# Patient Record
Sex: FEMALE | Race: WHITE | NOT HISPANIC OR LATINO | URBAN - METROPOLITAN AREA
[De-identification: names, ages, dates, MRNs, and addresses within clinical notes are randomized per-mention and may not be internally consistent; named-entity substitution may affect disease eponyms.]

---

## 2017-11-17 ENCOUNTER — IMPORTED ENCOUNTER (OUTPATIENT)
Dept: URBAN - METROPOLITAN AREA CLINIC 50 | Facility: CLINIC | Age: 72
End: 2017-11-17

## 2017-11-27 ENCOUNTER — IMPORTED ENCOUNTER (OUTPATIENT)
Dept: URBAN - METROPOLITAN AREA CLINIC 50 | Facility: CLINIC | Age: 72
End: 2017-11-27

## 2018-12-31 ENCOUNTER — IMPORTED ENCOUNTER (OUTPATIENT)
Dept: URBAN - METROPOLITAN AREA CLINIC 50 | Facility: CLINIC | Age: 73
End: 2018-12-31

## 2019-09-23 ENCOUNTER — IMPORTED ENCOUNTER (OUTPATIENT)
Dept: URBAN - METROPOLITAN AREA CLINIC 50 | Facility: CLINIC | Age: 74
End: 2019-09-23

## 2020-08-31 ENCOUNTER — IMPORTED ENCOUNTER (OUTPATIENT)
Dept: URBAN - METROPOLITAN AREA CLINIC 50 | Facility: CLINIC | Age: 75
End: 2020-08-31

## 2021-04-18 ASSESSMENT — TONOMETRY
OD_IOP_MMHG: 14
OD_IOP_MMHG: 14
OD_IOP_MMHG: 12
OS_IOP_MMHG: 14
OS_IOP_MMHG: 15
OD_IOP_MMHG: 16
OD_IOP_MMHG: 16
OD_IOP_MMHG: 12
OD_IOP_MMHG: 14
OD_IOP_MMHG: 14
OS_IOP_MMHG: 13
OS_IOP_MMHG: 15

## 2021-04-18 ASSESSMENT — VISUAL ACUITY
OD_BAT: 20/400-
OS_CC: J1+@ 16 IN
OS_CC: J1+
OS_CC: J3
OS_CC: J1+
OD_CC: 20/25
OD_SC: 20/20
OS_BAT: 20/400-
OD_CC: J3
OD_CC: J1+
OS_OTHER: 20/400-. 20/400-.
OS_CC: 20/30
OD_CC: J1+
OS_SC: 20/30
OD_SC: 20/30
OS_SC: 20/30+2
OD_OTHER: 20/400-. 20/400-.
OS_CC: 20/25
OD_CC: 20/20
OD_CC: J1+@ 16 IN

## 2021-04-18 ASSESSMENT — PACHYMETRY
OD_CT_UM: 584
OS_CT_UM: 552
OD_CT_UM: 584
OD_CT_UM: 584
OS_CT_UM: 552
OD_CT_UM: 584
OD_CT_UM: 584
OS_CT_UM: 552

## 2021-08-26 ENCOUNTER — PREPPED CHART (OUTPATIENT)
Dept: URBAN - METROPOLITAN AREA CLINIC 52 | Facility: CLINIC | Age: 76
End: 2021-08-26

## 2021-08-30 ENCOUNTER — ANNUAL COMPREHENSIVE EXAM (OUTPATIENT)
Dept: URBAN - METROPOLITAN AREA CLINIC 52 | Facility: CLINIC | Age: 76
End: 2021-08-30

## 2021-08-30 DIAGNOSIS — E11.9: ICD-10-CM

## 2021-08-30 DIAGNOSIS — H40.013: ICD-10-CM

## 2021-08-30 DIAGNOSIS — H43.813: ICD-10-CM

## 2021-08-30 PROCEDURE — 92134 CPTRZ OPH DX IMG PST SGM RTA: CPT

## 2021-08-30 PROCEDURE — 92014 COMPRE OPH EXAM EST PT 1/>: CPT

## 2021-08-30 ASSESSMENT — VISUAL ACUITY
OS_GLARE: 20/400
OS_CC: 20/30-2
OS_GLARE: 20/70
OU_CC: J1
OD_GLARE: 20/50
OD_CC: 20/30-2
OD_GLARE: 20/60

## 2021-08-30 ASSESSMENT — TONOMETRY
OS_IOP_MMHG: 13
OS_IOP_MMHG: 13
OD_IOP_MMHG: 11
OD_IOP_MMHG: 13

## 2023-04-05 ENCOUNTER — ESTABLISHED PATIENT (OUTPATIENT)
Dept: URBAN - METROPOLITAN AREA CLINIC 52 | Facility: CLINIC | Age: 78
End: 2023-04-05

## 2023-04-05 DIAGNOSIS — H35.372: ICD-10-CM

## 2023-04-05 DIAGNOSIS — H43.813: ICD-10-CM

## 2023-04-05 DIAGNOSIS — H53.2: ICD-10-CM

## 2023-04-05 DIAGNOSIS — H40.013: ICD-10-CM

## 2023-04-05 DIAGNOSIS — E11.9: ICD-10-CM

## 2023-04-05 PROCEDURE — 92012 INTRM OPH EXAM EST PATIENT: CPT

## 2023-04-05 ASSESSMENT — TONOMETRY
OD_IOP_MMHG: 06
OS_IOP_MMHG: 09
OS_IOP_MMHG: 09
OD_IOP_MMHG: 08

## 2023-04-05 ASSESSMENT — VISUAL ACUITY
OS_CC: 20/25-2
OU_CC: 20/25
OD_CC: 20/25-2
OU_CC: J1

## 2024-10-16 NOTE — PATIENT DISCUSSION
COUNSELING: Transitional Care Follow Up Visit  Subjective     Kiley Last is a 78 y.o. female who presents for a transitional care management visit.    Within 48 business hours after discharge our office contacted her via telephone to coordinate her care and needs.      I reviewed and discussed the details of that call along with the discharge summary, hospital problems, inpatient lab results, inpatient diagnostic studies, and consultation reports with Kiley.     Current outpatient and discharge medications have been reconciled for the patient.  Reviewed by: Miranda Morgan MD          10/9/2024     4:53 PM   Date of TCM Phone Call   [facility]   Date of Admission 10/8/2024   Date of Discharge 10/9/2024   Discharge Disposition Home or Self Care     Risk for Readmission (LACE) Score: 1 (10/9/2024  6:00 AM)      History of Present Illness   Course During Hospital Stay:  admitted to [facility] with sudden onset of at least 2 episodes of chest discomfort, associated with nausea and diaphoresis. 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 Full eval in  was reassuring - all test reviewed. She was d/c home on pantoprazole. She hasn't had any symptoms since. She complains of ongoing fatigue and diffuse arthritis.  She has had several deaths in her family which have been very hard.     The following portions of the patient's history were reviewed and updated as appropriate: allergies, current medications, past family history, past medical history, past social history, past surgical history, and problem list.    Review of Systems   Constitutional:  Positive for fatigue. Negative for activity change and unexpected weight change.   Respiratory:  Negative for cough, chest tightness and shortness of breath.    Musculoskeletal:  Positive for arthralgias.   Neurological:  Negative for light-headedness and headaches.   Psychiatric/Behavioral:  Negative for dysphoric mood.  "Nervous/anxious: feels the citalopram is helpful.        Objective   /64   Pulse 78   Ht 165.1 cm (65\")   Wt 77.1 kg (170 lb)   BMI 28.29 kg/m²   Physical Exam  Constitutional:       Appearance: Normal appearance.   Cardiovascular:      Rate and Rhythm: Normal rate and regular rhythm.   Pulmonary:      Effort: Pulmonary effort is normal.   Chest:      Chest wall: No tenderness.   Musculoskeletal:      Right lower leg: No edema.      Left lower leg: No edema.   Psychiatric:         Mood and Affect: Mood normal.         Thought Content: Thought content normal.         Assessment & Plan   Problems Addressed this Visit       Essential hypertension    Chest pain - Primary     Diagnoses         Codes Comments    Chest pain, unspecified type    -  Primary ICD-10-CM: R07.9  ICD-9-CM: 786.50 she has been symptom free since PPI, no previous reflux sx- would cont for at least a month. Reassured about cardiac. Stress plays a role.    Essential hypertension     ICD-10-CM: I10  ICD-9-CM: 401.9 Controlled, no change.                       "